# Patient Record
Sex: FEMALE | Race: OTHER | NOT HISPANIC OR LATINO | Employment: OTHER | URBAN - METROPOLITAN AREA
[De-identification: names, ages, dates, MRNs, and addresses within clinical notes are randomized per-mention and may not be internally consistent; named-entity substitution may affect disease eponyms.]

---

## 2017-02-21 NOTE — PATIENT DISCUSSION
1.  Pseudophakia OU - IOLs stable. OD toric Capsule open OS capsule clear. DR Lara Melara 2010. Monitor. 2. ARMD OU dry - Importance of smoking cessation blood pressure control and healthy diet were emphasized. In accordance with the AREDS study a good multivitamin containing EC and Zinc were recommened to be taken daily. Patient was instructed to self monitor their monocular vision (reading/Amsler Grid) at least weekly. Patient should immediately report any new onset of decreased vision or metamorphopsia. sees DR Nugent Running yearly and last saw him in Dec 2016.  stable. 3. Refractive error- rx change. Pt want to get new readers. Has trouble focusing after 1 hr reading. 4. ERM OD- stable5. Diplopia- corrected with prisms. 6.   RTN 1 yr Vanessa

## 2017-08-18 NOTE — PATIENT DISCUSSION
1.  Pseudophakia OU - IOLs stable. OD toric Capsule open OS capsule clear. DR Jennifer Kaur 2010. Monitor. 2. ARMD OU dry - Importance of smoking cessation blood pressure control and healthy diet were emphasized. In accordance with the AREDS study a good multivitamin containing EC and Zinc were recommened to be taken daily. Patient was instructed to self monitor their monocular vision (reading/Amsler Grid) at least weekly. Patient should immediately report any new onset of decreased vision or metamorphopsia. sees DR Bela Bhatia yearly and last saw him in Dec 2016.  stable. 3. Refractive error- Pt lost his computer rx. Pt wants a TF or BF. Rec BF. Has trouble focusing after 1 hr reading. 4. ERM OD- stable5. No Diplopia- 6.   RTN 1 yr Vanessa

## 2019-06-13 NOTE — PATIENT DISCUSSION
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 A intranasal approach for a dacryocystorhinostomy was planned. The purpose of the nasal endoscopy was to evaluate a surgical site for abnormalities which could alter the surgical course including turbinate hypertrophy or septal deviation. A Stortz 0 degree endoscope was placed intranasally on both sides and the following observations were noted: significant turbinate enlargement, no NSD.

## 2019-06-13 NOTE — PATIENT DISCUSSION
Punctal Dilation and Irrigation: The patient had a history of chronic epiphora on the left side. Punctal/Canalicular/Nasolacrimal Duct Obstruction was suspected. After informed consent a punctal dilator was placed in the affected punctum, which was dilated appropriately. A 1cc syringe filled with sterile eyewash with a blunt canula was prepared the blunt canula was inserted into the canalicular system. The result of the irrigation was as follows: complete obstruction.

## 2019-06-13 NOTE — PATIENT DISCUSSION
Nasolacrimal Duct Obstruction Counseling:  I have examined the patient and discussed or attempted dilation and irrigation of the nasolacrimal system. Obstruction to normal irrigation is found. The anatomy and causation of this problem was explained to the patient in detail. The risks and benefits of a dacryocystorhinostomy was discussed in detail, including the need for placement of a Medina tubes for up to 6 months during the healing process. The patient understands and has had all questions answered and desires to proceed with the surgery as explained.

## 2019-08-20 NOTE — PATIENT DISCUSSION
Resume normal activity. Resume any medications that were discontinued for  surgery. Stop cold compresses. Use prescribed Flonase nasal spray bid in affected nostril(s) for 4 months. Audelia Earnest Med Sinus Rinse q day for 4 months and prn congestion. At's prn for itching around tubes.   Plan f/u in 4 months for tube removal.

## 2019-12-31 NOTE — PATIENT DISCUSSION
The patient had a Medina tube on the left side. Topical anesthetic drops were given to the affected eye. This was followed by severing the tube at the caruncle with joanne scissors. A lighted nasal speculum was introduced to the affected nostril and joanne sissors were used to cut the retaining 4.0 prolene suture. This was followed by introducing a duck-billed forceps. Under illumination by a lighted nasal speculum, the forceps were used to removed the retained silastic foreign body. The patient did well, and there were no complications.

## 2022-03-23 ENCOUNTER — NEW PATIENT (OUTPATIENT)
Dept: URBAN - METROPOLITAN AREA CLINIC 9 | Facility: CLINIC | Age: 77
End: 2022-03-23

## 2022-03-23 DIAGNOSIS — H25.13: ICD-10-CM

## 2022-03-23 DIAGNOSIS — H43.813: ICD-10-CM

## 2022-03-23 DIAGNOSIS — H34.231: ICD-10-CM

## 2022-03-23 DIAGNOSIS — H34.212: ICD-10-CM

## 2022-03-23 PROCEDURE — 92134 CPTRZ OPH DX IMG PST SGM RTA: CPT

## 2022-03-23 PROCEDURE — 92004 COMPRE OPH EXAM NEW PT 1/>: CPT

## 2022-03-23 ASSESSMENT — TONOMETRY
OD_IOP_MMHG: 21
OS_IOP_MMHG: 15

## 2022-03-23 ASSESSMENT — VISUAL ACUITY
OS_CC: 20/60+1
OD_CC: 20/80

## 2022-03-23 NOTE — PATIENT DISCUSSION
Return to Dr. Bui [facility] for bloodwork to screen for inflammatory arteritis i.e. giant cell arteritis (CBC, ESR, CRP).

## 2022-03-23 NOTE — PATIENT DISCUSSION
Return to Dr. Chelsea Carcamo to schedule a carotid duplex and cardiac echo (screening for ASD or valve disease).

## 2022-05-04 ENCOUNTER — ESTABLISHED PATIENT (OUTPATIENT)
Dept: URBAN - METROPOLITAN AREA CLINIC 9 | Facility: CLINIC | Age: 77
End: 2022-05-04

## 2022-05-04 DIAGNOSIS — H34.231: ICD-10-CM

## 2022-05-04 DIAGNOSIS — H43.813: ICD-10-CM

## 2022-05-04 DIAGNOSIS — H34.212: ICD-10-CM

## 2022-05-04 PROCEDURE — 92012 INTRM OPH EXAM EST PATIENT: CPT

## 2022-05-04 PROCEDURE — 92134 CPTRZ OPH DX IMG PST SGM RTA: CPT

## 2022-05-04 ASSESSMENT — TONOMETRY
OD_IOP_MMHG: 19
OS_IOP_MMHG: 15

## 2022-05-04 ASSESSMENT — VISUAL ACUITY
OD_CC: 20/100-1
OS_CC: 20/60

## 2022-05-04 NOTE — PATIENT DISCUSSION
Obtain results from office of Dr. Millie Serrano for carotid duplex and cardiac echo (screening for ASD or valve disease), bloodwork to screen for inflammatory arteritis i.e. giant cell arteritis (CBC, ESR, CRP).

## 2022-06-01 ENCOUNTER — EMERGENCY VISIT (OUTPATIENT)
Dept: URBAN - METROPOLITAN AREA CLINIC 9 | Facility: CLINIC | Age: 77
End: 2022-06-01

## 2022-06-01 DIAGNOSIS — H34.212: ICD-10-CM

## 2022-06-01 DIAGNOSIS — H43.11: ICD-10-CM

## 2022-06-01 DIAGNOSIS — H40.051: ICD-10-CM

## 2022-06-01 DIAGNOSIS — H34.231: ICD-10-CM

## 2022-06-01 PROCEDURE — 92134 CPTRZ OPH DX IMG PST SGM RTA: CPT

## 2022-06-01 PROCEDURE — 92014 COMPRE OPH EXAM EST PT 1/>: CPT

## 2022-06-01 PROCEDURE — 67028 INJECTION EYE DRUG: CPT

## 2022-06-01 ASSESSMENT — VISUAL ACUITY
OS_CC: 20/60
OS_PH: 20/50-1

## 2022-06-01 ASSESSMENT — TONOMETRY
OD_IOP_MMHG: 39
OS_IOP_MMHG: 15

## 2022-06-01 NOTE — PATIENT DISCUSSION
After discussion of risks, benefits, and alternatives, including loss of vision, blindness, need for additional surgery and/or retinal detachment, patient elects to proceed with surgery.

## 2022-06-01 NOTE — PATIENT DISCUSSION
Obtain medical clearance from Dr Herman Ventura after recent carotid duplex and cardiac echo (screening for ASD or valve disease), bloodwork to screen for inflammatory arteritis i.e. giant cell arteritis (CBC, ESR, CRP).

## 2022-06-01 NOTE — PROCEDURE NOTE: CLINICAL
PROCEDURE NOTE: Avastin () OD. Diagnosis: Vitreous Hemorrhage. Anesthesia: Topical/Subconjunctival. Prep: Betadine Drops and Betadine Scrub. Betadine prep was performed. Product is within expiration date, and has been verified. An unopened 30 g needle was securely affixed to the 1 cc syringe. Excess drug and air bubbles were carefully expressed such that the plunger aligned with the .05 mL tk on the syringe. A lid speculum was used. After the Betadine prep, intravitreal injection of Avastin 1.25mg/0.05 ml was given. Injection site: 3-4 mm from the limbus. The needle was withdrawn; retinal perfusion was verified. Lid speculum removed. The eye was irrigated with sterile irrigating solution. The betadine was washed away. Patient tolerated procedure well. Count fingers vision was verified. There were no complications. Patient given office phone number/answering service phone number. Post-injection instructions were reviewed and understood. Symptoms of RD and endophthalmitis following intravitreal injection were discussed. Patient advised to call right away if any loss of vision, new floaters, or eye pain. Post-op instructions given. Patient was given the standard instruction sheet. Appropriate follow-up was arranged. Amber Rankin

## 2022-06-01 NOTE — PATIENT DISCUSSION
Recommended Avastin injection today. The injection was given and was well-tolerated. Post-injection instructions were reviewed and understood by the patient. Advised to call promptly for any loss of vision or eye pain.

## 2022-06-22 ENCOUNTER — POST-OP (OUTPATIENT)
Dept: URBAN - METROPOLITAN AREA CLINIC 9 | Facility: CLINIC | Age: 77
End: 2022-06-22

## 2022-06-22 DIAGNOSIS — H43.11: ICD-10-CM

## 2022-06-22 PROCEDURE — 99024 POSTOP FOLLOW-UP VISIT: CPT

## 2022-06-22 ASSESSMENT — VISUAL ACUITY
OS_SC: 20/60
OD_SC: 20/400
OS_PH: 20/50-1

## 2022-06-22 ASSESSMENT — TONOMETRY
OD_IOP_MMHG: 14
OS_IOP_MMHG: 16

## 2022-06-22 NOTE — PATIENT DISCUSSION
Obtain medical clearance from Dr Alejandro Munson after recent carotid duplex and cardiac echo (screening for ASD or valve disease), bloodwork to screen for inflammatory arteritis i.e. giant cell arteritis (CBC, ESR, CRP).

## 2022-06-22 NOTE — PATIENT DISCUSSION
Postop day 1 exam s/p PPV/PRP OD.  Doing well today.  Reviewed RD and endophthalmitis precautions.  Start Pred QID, Cipro QID, Atropine BID.

## 2022-06-22 NOTE — PATIENT DISCUSSION
Discussed very guarded visual prognosis following resolution of hemorrhage due to history of BRAO. Self

## 2022-06-29 ENCOUNTER — POST-OP (OUTPATIENT)
Dept: URBAN - METROPOLITAN AREA CLINIC 9 | Facility: CLINIC | Age: 77
End: 2022-06-29

## 2022-06-29 DIAGNOSIS — H43.11: ICD-10-CM

## 2022-06-29 PROCEDURE — 99024 POSTOP FOLLOW-UP VISIT: CPT

## 2022-06-29 ASSESSMENT — TONOMETRY
OS_IOP_MMHG: 11
OD_IOP_MMHG: 14

## 2022-06-29 ASSESSMENT — VISUAL ACUITY
OS_CC: 20/60-2
OS_PH: 20/50-2
OD_CC: 20/400

## 2022-06-29 NOTE — PATIENT DISCUSSION
Obtain medical clearance from Dr Rama Roper after recent carotid duplex and cardiac echo (screening for ASD or valve disease), bloodwork to screen for inflammatory arteritis i.e. giant cell arteritis (CBC, ESR, CRP).

## 2022-06-29 NOTE — PATIENT DISCUSSION
Postop week 1 exam s/p PPV/PRP OD.  Doing well today.  Reviewed RD and endophthalmitis precautions.  Start Pred taper per instructions.  Stop Cipro QID, Atropine BID.

## 2022-07-27 ENCOUNTER — POST-OP (OUTPATIENT)
Dept: URBAN - METROPOLITAN AREA CLINIC 9 | Facility: CLINIC | Age: 77
End: 2022-07-27

## 2022-07-27 DIAGNOSIS — H34.231: ICD-10-CM

## 2022-07-27 DIAGNOSIS — H43.11: ICD-10-CM

## 2022-07-27 DIAGNOSIS — H43.813: ICD-10-CM

## 2022-07-27 DIAGNOSIS — H35.371: ICD-10-CM

## 2022-07-27 PROCEDURE — 99024 POSTOP FOLLOW-UP VISIT: CPT

## 2022-07-27 PROCEDURE — 92134 CPTRZ OPH DX IMG PST SGM RTA: CPT

## 2022-07-27 ASSESSMENT — VISUAL ACUITY
OS_PH: 20/40
OD_CC: 20/200
OD_PH: 20/100+1
OS_CC: 20/60

## 2022-07-27 ASSESSMENT — TONOMETRY
OD_IOP_MMHG: 15
OS_IOP_MMHG: 16

## 2022-07-27 NOTE — PATIENT DISCUSSION
Postop month 1 exam s/p PPV/PRP OD.  Doing well today.  Reviewed RD and endophthalmitis precautions.

## 2022-07-27 NOTE — PATIENT DISCUSSION
Completed an embolic work-up for carotid and cardiac disease with Dr Serafin Richardson [facility]).

## 2023-04-13 ENCOUNTER — POST-OP (OUTPATIENT)
Dept: URBAN - METROPOLITAN AREA CLINIC 9 | Facility: CLINIC | Age: 78
End: 2023-04-13

## 2023-04-13 DIAGNOSIS — Z98.890: ICD-10-CM

## 2023-04-13 DIAGNOSIS — H25.12: ICD-10-CM

## 2023-04-13 DIAGNOSIS — Z96.1: ICD-10-CM

## 2023-04-13 PROCEDURE — 99024NOCM NON COMANAGED POST OP CARE

## 2023-04-13 ASSESSMENT — KERATOMETRY
OS_K1POWER_DIOPTERS: 43.00
OS_K2POWER_DIOPTERS: 43.75
OS_AXISANGLE_DEGREES: 54
OD_AXISANGLE_DEGREES: 93
OS_AXISANGLE2_DEGREES: 144
OD_K1POWER_DIOPTERS: 41.75
OD_AXISANGLE2_DEGREES: 3
OD_K2POWER_DIOPTERS: 43.50

## 2023-04-13 ASSESSMENT — VISUAL ACUITY
OU_SC: 20/80+1
OD_SC: 20/60-1
OS_BCVA: 20/60

## 2023-04-13 ASSESSMENT — TONOMETRY: OD_IOP_MMHG: 13

## 2024-10-11 NOTE — PATIENT DISCUSSION
Adult Annual Physical  Name: Mima Maki      : 1998      MRN: [id number]  Encounter Provider: SHANNAN Valentine  Encounter Date: 10/11/2024   Encounter department: [facility] Advised patient that vitrectomy may accelerate cataract progression. [place]   Patient follows with gyn  